# Patient Record
Sex: MALE | Race: BLACK OR AFRICAN AMERICAN | NOT HISPANIC OR LATINO
[De-identification: names, ages, dates, MRNs, and addresses within clinical notes are randomized per-mention and may not be internally consistent; named-entity substitution may affect disease eponyms.]

---

## 2023-08-01 ENCOUNTER — DASHBOARD ENCOUNTERS (OUTPATIENT)
Age: 73
End: 2023-08-01

## 2023-08-01 ENCOUNTER — WEB ENCOUNTER (OUTPATIENT)
Dept: URBAN - METROPOLITAN AREA CLINIC 84 | Facility: CLINIC | Age: 73
End: 2023-08-01

## 2023-08-01 ENCOUNTER — OFFICE VISIT (OUTPATIENT)
Dept: URBAN - METROPOLITAN AREA CLINIC 84 | Facility: CLINIC | Age: 73
End: 2023-08-01
Payer: MEDICARE

## 2023-08-01 VITALS
HEART RATE: 62 BPM | SYSTOLIC BLOOD PRESSURE: 146 MMHG | WEIGHT: 181.8 LBS | TEMPERATURE: 97.2 F | DIASTOLIC BLOOD PRESSURE: 77 MMHG | HEIGHT: 71 IN | BODY MASS INDEX: 25.45 KG/M2

## 2023-08-01 DIAGNOSIS — Z86.010 PERSONAL HISTORY OF COLONIC POLYPS: ICD-10-CM

## 2023-08-01 PROBLEM — 428283002: Status: ACTIVE | Noted: 2023-08-01

## 2023-08-01 PROCEDURE — 993 APS NON BILLABLE: Performed by: INTERNAL MEDICINE

## 2023-08-01 RX ORDER — MEMANTINE HYDROCHLORIDE 10 MG/1
TABLET ORAL
Qty: 180 TABLET | Status: ACTIVE | COMMUNITY

## 2023-08-01 RX ORDER — OXYBUTYNIN CHLORIDE 5 MG/1
TABLET ORAL
Qty: 180 TABLET | Status: ACTIVE | COMMUNITY

## 2023-08-01 RX ORDER — HYDROCHLOROTHIAZIDE 25 MG/1
TABLET ORAL
Qty: 90 TABLET | Status: ACTIVE | COMMUNITY

## 2023-08-01 RX ORDER — DONEPEZIL HYDROCHLORIDE 10 MG/1
TABLET, FILM COATED ORAL
Qty: 90 TABLET | Status: ACTIVE | COMMUNITY

## 2023-08-01 RX ORDER — AZELASTINE 1 MG/ML
SPRAY, METERED NASAL
Qty: 30 MILLILITER | Status: ACTIVE | COMMUNITY

## 2023-08-01 RX ORDER — TAMSULOSIN HYDROCHLORIDE 0.4 MG/1
CAPSULE ORAL
Qty: 90 CAPSULE | Status: ACTIVE | COMMUNITY

## 2023-08-01 RX ORDER — LOSARTAN POTASSIUM 50 MG/1
TABLET, FILM COATED ORAL
Qty: 90 TABLET | Status: ACTIVE | COMMUNITY

## 2023-08-01 RX ORDER — ATORVASTATIN CALCIUM 40 MG/1
TABLET, FILM COATED ORAL
Qty: 90 TABLET | Status: ACTIVE | COMMUNITY

## 2023-08-23 ENCOUNTER — OFFICE VISIT (OUTPATIENT)
Dept: URBAN - METROPOLITAN AREA SURGERY CENTER 20 | Facility: SURGERY CENTER | Age: 73
End: 2023-08-23

## 2024-07-02 ENCOUNTER — OFFICE VISIT (OUTPATIENT)
Dept: URBAN - METROPOLITAN AREA TELEHEALTH 2 | Facility: TELEHEALTH | Age: 74
End: 2024-07-02
Payer: MEDICARE

## 2024-07-02 ENCOUNTER — TELEPHONE ENCOUNTER (OUTPATIENT)
Dept: URBAN - METROPOLITAN AREA CLINIC 84 | Facility: CLINIC | Age: 74
End: 2024-07-02

## 2024-07-02 VITALS — WEIGHT: 141 LBS | HEIGHT: 71 IN | BODY MASS INDEX: 19.74 KG/M2

## 2024-07-02 DIAGNOSIS — Z86.010 PERSONAL HISTORY OF COLON POLYPS: ICD-10-CM

## 2024-07-02 PROCEDURE — 99214 OFFICE O/P EST MOD 30 MIN: CPT | Performed by: INTERNAL MEDICINE

## 2024-07-02 RX ORDER — OXYBUTYNIN CHLORIDE 5 MG/1
TABLET ORAL
Qty: 180 TABLET | Status: ACTIVE | COMMUNITY

## 2024-07-02 RX ORDER — MEMANTINE HYDROCHLORIDE 10 MG/1
TABLET ORAL
Qty: 180 TABLET | Status: ACTIVE | COMMUNITY

## 2024-07-02 RX ORDER — ATORVASTATIN CALCIUM 40 MG/1
TABLET, FILM COATED ORAL
Qty: 90 TABLET | Status: ACTIVE | COMMUNITY

## 2024-07-02 RX ORDER — TAMSULOSIN HYDROCHLORIDE 0.4 MG/1
CAPSULE ORAL
Qty: 90 CAPSULE | Status: ACTIVE | COMMUNITY

## 2024-07-02 RX ORDER — HYDROCHLOROTHIAZIDE 25 MG/1
TABLET ORAL
Qty: 90 TABLET | Status: ACTIVE | COMMUNITY

## 2024-07-02 RX ORDER — AZELASTINE 1 MG/ML
SPRAY, METERED NASAL
Qty: 30 MILLILITER | Status: ACTIVE | COMMUNITY

## 2024-07-02 RX ORDER — DONEPEZIL HYDROCHLORIDE 10 MG/1
TABLET, FILM COATED ORAL
Qty: 90 TABLET | Status: ACTIVE | COMMUNITY

## 2024-07-02 RX ORDER — POLYETHYLENE GLYCOL 3350, SODIUM CHLORIDE, SODIUM BICARBONATE, POTASSIUM CHLORIDE 420; 11.2; 5.72; 1.48 G/4L; G/4L; G/4L; G/4L
AS DIRECTED POWDER, FOR SOLUTION ORAL ONCE
Qty: 1 BOTTLE | Refills: 0 | OUTPATIENT
Start: 2024-07-02 | End: 2024-07-03

## 2024-07-02 RX ORDER — LOSARTAN POTASSIUM 50 MG/1
TABLET, FILM COATED ORAL
Qty: 90 TABLET | Status: ACTIVE | COMMUNITY

## 2024-07-02 NOTE — HPI-TODAY'S VISIT:
Patient seen last year by Dr. Bob.  He had colonoscopy by me in 2019 with a TA.  He has a poor appetite and he has had an unintentional weight loss.  He denies abdominal pain.  He denies GERD/N/V/dysphagia.  He denies LGI symptoms.  He denies LGI bleed or melena.  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

## 2024-07-29 ENCOUNTER — OFFICE VISIT (OUTPATIENT)
Dept: URBAN - METROPOLITAN AREA SURGERY CENTER 20 | Facility: SURGERY CENTER | Age: 74
End: 2024-07-29

## 2024-08-26 ENCOUNTER — CLAIMS CREATED FROM THE CLAIM WINDOW (OUTPATIENT)
Dept: URBAN - METROPOLITAN AREA SURGERY CENTER 20 | Facility: SURGERY CENTER | Age: 74
End: 2024-08-26

## 2024-08-26 ENCOUNTER — TELEPHONE ENCOUNTER (OUTPATIENT)
Dept: URBAN - METROPOLITAN AREA CLINIC 84 | Facility: CLINIC | Age: 74
End: 2024-08-26

## 2024-08-26 RX ORDER — LOSARTAN POTASSIUM 50 MG/1
TABLET, FILM COATED ORAL
Qty: 90 TABLET | Status: ACTIVE | COMMUNITY

## 2024-08-26 RX ORDER — AZELASTINE 1 MG/ML
SPRAY, METERED NASAL
Qty: 30 MILLILITER | Status: ACTIVE | COMMUNITY

## 2024-08-26 RX ORDER — HYDROCHLOROTHIAZIDE 25 MG/1
TABLET ORAL
Qty: 90 TABLET | Status: ACTIVE | COMMUNITY

## 2024-08-26 RX ORDER — TAMSULOSIN HYDROCHLORIDE 0.4 MG/1
CAPSULE ORAL
Qty: 90 CAPSULE | Status: ACTIVE | COMMUNITY

## 2024-08-26 RX ORDER — ATORVASTATIN CALCIUM 40 MG/1
TABLET, FILM COATED ORAL
Qty: 90 TABLET | Status: ACTIVE | COMMUNITY

## 2024-08-26 RX ORDER — OXYBUTYNIN CHLORIDE 5 MG/1
TABLET ORAL
Qty: 180 TABLET | Status: ACTIVE | COMMUNITY

## 2024-08-26 RX ORDER — DONEPEZIL HYDROCHLORIDE 10 MG/1
TABLET, FILM COATED ORAL
Qty: 90 TABLET | Status: ACTIVE | COMMUNITY

## 2024-08-26 RX ORDER — MEMANTINE HYDROCHLORIDE 10 MG/1
TABLET ORAL
Qty: 180 TABLET | Status: ACTIVE | COMMUNITY

## 2024-09-30 ENCOUNTER — OFFICE VISIT (OUTPATIENT)
Dept: URBAN - METROPOLITAN AREA SURGERY CENTER 20 | Facility: SURGERY CENTER | Age: 74
End: 2024-09-30

## 2024-09-30 ENCOUNTER — TELEPHONE ENCOUNTER (OUTPATIENT)
Dept: URBAN - METROPOLITAN AREA CLINIC 84 | Facility: CLINIC | Age: 74
End: 2024-09-30

## 2024-09-30 RX ORDER — AZELASTINE 1 MG/ML
SPRAY, METERED NASAL
Qty: 30 MILLILITER | Status: ACTIVE | COMMUNITY

## 2024-09-30 RX ORDER — ATORVASTATIN CALCIUM 40 MG/1
TABLET, FILM COATED ORAL
Qty: 90 TABLET | Status: ACTIVE | COMMUNITY

## 2024-09-30 RX ORDER — OXYBUTYNIN CHLORIDE 5 MG/1
TABLET ORAL
Qty: 180 TABLET | Status: ACTIVE | COMMUNITY

## 2024-09-30 RX ORDER — LOSARTAN POTASSIUM 50 MG/1
TABLET, FILM COATED ORAL
Qty: 90 TABLET | Status: ACTIVE | COMMUNITY

## 2024-09-30 RX ORDER — TAMSULOSIN HYDROCHLORIDE 0.4 MG/1
CAPSULE ORAL
Qty: 90 CAPSULE | Status: ACTIVE | COMMUNITY

## 2024-09-30 RX ORDER — HYDROCHLOROTHIAZIDE 25 MG/1
TABLET ORAL
Qty: 90 TABLET | Status: ACTIVE | COMMUNITY

## 2024-09-30 RX ORDER — DONEPEZIL HYDROCHLORIDE 10 MG/1
TABLET, FILM COATED ORAL
Qty: 90 TABLET | Status: ACTIVE | COMMUNITY

## 2024-09-30 RX ORDER — MEMANTINE HYDROCHLORIDE 10 MG/1
TABLET ORAL
Qty: 180 TABLET | Status: ACTIVE | COMMUNITY